# Patient Record
Sex: FEMALE | Race: ASIAN | NOT HISPANIC OR LATINO | URBAN - METROPOLITAN AREA
[De-identification: names, ages, dates, MRNs, and addresses within clinical notes are randomized per-mention and may not be internally consistent; named-entity substitution may affect disease eponyms.]

---

## 2017-03-30 ENCOUNTER — OUTPATIENT (OUTPATIENT)
Dept: OUTPATIENT SERVICES | Facility: HOSPITAL | Age: 62
LOS: 1 days | Discharge: HOME | End: 2017-03-30

## 2017-06-27 DIAGNOSIS — N63 UNSPECIFIED LUMP IN BREAST: ICD-10-CM

## 2017-06-27 DIAGNOSIS — N60.22 FIBROADENOSIS OF LEFT BREAST: ICD-10-CM

## 2017-06-27 DIAGNOSIS — N62 HYPERTROPHY OF BREAST: ICD-10-CM

## 2020-07-30 ENCOUNTER — APPOINTMENT (RX ONLY)
Dept: URBAN - METROPOLITAN AREA CLINIC 57 | Facility: CLINIC | Age: 65
Setting detail: DERMATOLOGY
End: 2020-07-30

## 2020-07-30 DIAGNOSIS — L81.4 OTHER MELANIN HYPERPIGMENTATION: ICD-10-CM

## 2020-07-30 DIAGNOSIS — L57.8 OTHER SKIN CHANGES DUE TO CHRONIC EXPOSURE TO NONIONIZING RADIATION: ICD-10-CM

## 2020-07-30 DIAGNOSIS — D18.0 HEMANGIOMA: ICD-10-CM

## 2020-07-30 DIAGNOSIS — L82.1 OTHER SEBORRHEIC KERATOSIS: ICD-10-CM

## 2020-07-30 DIAGNOSIS — D22 MELANOCYTIC NEVI: ICD-10-CM

## 2020-07-30 PROBLEM — D18.01 HEMANGIOMA OF SKIN AND SUBCUTANEOUS TISSUE: Status: ACTIVE | Noted: 2020-07-30

## 2020-07-30 PROBLEM — D22.9 MELANOCYTIC NEVI, UNSPECIFIED: Status: ACTIVE | Noted: 2020-07-30

## 2020-07-30 PROCEDURE — ? FULL BODY SKIN EXAM

## 2020-07-30 PROCEDURE — ? COUNSELING

## 2020-07-30 PROCEDURE — ? ADDITIONAL NOTES

## 2020-07-30 PROCEDURE — 99203 OFFICE O/P NEW LOW 30 MIN: CPT

## 2020-07-30 ASSESSMENT — LOCATION ZONE DERM: LOCATION ZONE: TRUNK

## 2020-07-30 ASSESSMENT — LOCATION DETAILED DESCRIPTION DERM
LOCATION DETAILED: PERIUMBILICAL SKIN
LOCATION DETAILED: RIGHT INFERIOR UPPER BACK

## 2020-07-30 ASSESSMENT — LOCATION SIMPLE DESCRIPTION DERM
LOCATION SIMPLE: ABDOMEN
LOCATION SIMPLE: RIGHT UPPER BACK

## 2021-08-02 ENCOUNTER — APPOINTMENT (RX ONLY)
Dept: URBAN - METROPOLITAN AREA CLINIC 57 | Facility: CLINIC | Age: 66
Setting detail: DERMATOLOGY
End: 2021-08-02

## 2021-08-02 DIAGNOSIS — L82.1 OTHER SEBORRHEIC KERATOSIS: ICD-10-CM

## 2021-08-02 DIAGNOSIS — L57.8 OTHER SKIN CHANGES DUE TO CHRONIC EXPOSURE TO NONIONIZING RADIATION: ICD-10-CM

## 2021-08-02 DIAGNOSIS — L81.4 OTHER MELANIN HYPERPIGMENTATION: ICD-10-CM

## 2021-08-02 DIAGNOSIS — D18.0 HEMANGIOMA: ICD-10-CM

## 2021-08-02 DIAGNOSIS — D22 MELANOCYTIC NEVI: ICD-10-CM

## 2021-08-02 PROBLEM — D18.01 HEMANGIOMA OF SKIN AND SUBCUTANEOUS TISSUE: Status: ACTIVE | Noted: 2021-08-02

## 2021-08-02 PROBLEM — D22.9 MELANOCYTIC NEVI, UNSPECIFIED: Status: ACTIVE | Noted: 2021-08-02

## 2021-08-02 PROCEDURE — 99213 OFFICE O/P EST LOW 20 MIN: CPT

## 2021-08-02 PROCEDURE — ? TREATMENT REGIMEN

## 2021-08-02 PROCEDURE — ? COUNSELING

## 2021-08-02 PROCEDURE — ? FULL BODY SKIN EXAM

## 2021-08-02 PROCEDURE — ? ADDITIONAL NOTES

## 2021-08-02 ASSESSMENT — LOCATION DETAILED DESCRIPTION DERM
LOCATION DETAILED: INFERIOR THORACIC SPINE
LOCATION DETAILED: RIGHT INFERIOR UPPER BACK
LOCATION DETAILED: PERIUMBILICAL SKIN

## 2021-08-02 ASSESSMENT — LOCATION ZONE DERM: LOCATION ZONE: TRUNK

## 2021-08-02 ASSESSMENT — LOCATION SIMPLE DESCRIPTION DERM
LOCATION SIMPLE: ABDOMEN
LOCATION SIMPLE: RIGHT UPPER BACK
LOCATION SIMPLE: UPPER BACK

## 2022-08-15 ENCOUNTER — APPOINTMENT (RX ONLY)
Dept: URBAN - METROPOLITAN AREA CLINIC 57 | Facility: CLINIC | Age: 67
Setting detail: DERMATOLOGY
End: 2022-08-15

## 2022-08-15 DIAGNOSIS — D22 MELANOCYTIC NEVI: ICD-10-CM | Status: STABLE

## 2022-08-15 DIAGNOSIS — Z41.9 ENCOUNTER FOR PROCEDURE FOR PURPOSES OTHER THAN REMEDYING HEALTH STATE, UNSPECIFIED: ICD-10-CM

## 2022-08-15 DIAGNOSIS — Z12.83 ENCOUNTER FOR SCREENING FOR MALIGNANT NEOPLASM OF SKIN: ICD-10-CM

## 2022-08-15 DIAGNOSIS — L82.1 OTHER SEBORRHEIC KERATOSIS: ICD-10-CM | Status: STABLE

## 2022-08-15 DIAGNOSIS — L81.4 OTHER MELANIN HYPERPIGMENTATION: ICD-10-CM | Status: STABLE

## 2022-08-15 DIAGNOSIS — D18.0 HEMANGIOMA: ICD-10-CM | Status: STABLE

## 2022-08-15 PROBLEM — D18.01 HEMANGIOMA OF SKIN AND SUBCUTANEOUS TISSUE: Status: ACTIVE | Noted: 2022-08-15

## 2022-08-15 PROBLEM — D23.71 OTHER BENIGN NEOPLASM OF SKIN OF RIGHT LOWER LIMB, INCLUDING HIP: Status: ACTIVE | Noted: 2022-08-15

## 2022-08-15 PROBLEM — D22.5 MELANOCYTIC NEVI OF TRUNK: Status: ACTIVE | Noted: 2022-08-15

## 2022-08-15 PROCEDURE — ? COSMETIC CONSULTATION: BOTULINUM TOXIN

## 2022-08-15 PROCEDURE — ? COUNSELING

## 2022-08-15 PROCEDURE — ? FULL BODY SKIN EXAM

## 2022-08-15 PROCEDURE — ? ADDITIONAL NOTES

## 2022-08-15 PROCEDURE — 99213 OFFICE O/P EST LOW 20 MIN: CPT

## 2022-08-15 PROCEDURE — ? SUNSCREEN RECOMMENDATIONS

## 2022-08-15 ASSESSMENT — LOCATION DETAILED DESCRIPTION DERM
LOCATION DETAILED: LEFT MEDIAL UPPER BACK
LOCATION DETAILED: PERIUMBILICAL SKIN
LOCATION DETAILED: EPIGASTRIC SKIN
LOCATION DETAILED: RIGHT MEDIAL UPPER BACK
LOCATION DETAILED: LEFT INFERIOR MEDIAL UPPER BACK

## 2022-08-15 ASSESSMENT — LOCATION ZONE DERM: LOCATION ZONE: TRUNK

## 2022-08-15 ASSESSMENT — LOCATION SIMPLE DESCRIPTION DERM
LOCATION SIMPLE: LEFT UPPER BACK
LOCATION SIMPLE: ABDOMEN
LOCATION SIMPLE: RIGHT UPPER BACK

## 2022-08-15 NOTE — PROCEDURE: ADDITIONAL NOTES
Render Risk Assessment In Note?: no
Additional Notes: Recommend 50 units for forehead, glabella and crows feet total $700
Detail Level: Detailed

## 2022-08-18 ENCOUNTER — APPOINTMENT (RX ONLY)
Dept: URBAN - METROPOLITAN AREA CLINIC 57 | Facility: CLINIC | Age: 67
Setting detail: DERMATOLOGY
End: 2022-08-18

## 2022-08-18 DIAGNOSIS — Z41.9 ENCOUNTER FOR PROCEDURE FOR PURPOSES OTHER THAN REMEDYING HEALTH STATE, UNSPECIFIED: ICD-10-CM

## 2022-08-18 PROCEDURE — ? MEDICAL CONSULTATION HYDRAFACIAL

## 2022-08-18 PROCEDURE — ? ADDITIONAL NOTES

## 2022-08-18 PROCEDURE — ? MEDICAL CONSULTATION: LASER RESURFACING

## 2022-08-18 NOTE — PROCEDURE: ADDITIONAL NOTES
Render Risk Assessment In Note?: yes
Detail Level: Zone
Additional Notes: Plan:\\n\\nResurfacing and hydrafacial monthly for 4 months.\\nFollowing treatment series, you’ll receive a resurfacing treatment and a hydrafacial every 6 months to maintain. You may also invest in Botox post series to maintain results as well.\\n\\nUPCOMING APPOINTMENTS:\\n8/19 @1:30pm Laser\\n9/2 @10:30am Hydrafacial\\n10/11 @11am Laser\\n10/21 @10:30am Hydrafacial\\n11/1 @10:30am Laser\\n11/14 @10:30am Hydrafacial\\n1/9/23 @10:30am Laser\\n1/23/23 @10:30am Hydrafacial

## 2022-08-19 ENCOUNTER — APPOINTMENT (RX ONLY)
Dept: URBAN - METROPOLITAN AREA CLINIC 57 | Facility: CLINIC | Age: 67
Setting detail: DERMATOLOGY
End: 2022-08-19

## 2022-08-19 DIAGNOSIS — Z41.9 ENCOUNTER FOR PROCEDURE FOR PURPOSES OTHER THAN REMEDYING HEALTH STATE, UNSPECIFIED: ICD-10-CM

## 2022-08-19 PROCEDURE — ? VENUS VIVA

## 2022-08-19 ASSESSMENT — LOCATION SIMPLE DESCRIPTION DERM
LOCATION SIMPLE: LEFT CHEEK
LOCATION SIMPLE: LEFT EYEBROW
LOCATION SIMPLE: UPPER LIP

## 2022-08-19 ASSESSMENT — LOCATION DETAILED DESCRIPTION DERM
LOCATION DETAILED: LEFT SUPERIOR CENTRAL MALAR CHEEK
LOCATION DETAILED: LEFT CENTRAL EYEBROW
LOCATION DETAILED: PHILTRUM

## 2022-08-19 ASSESSMENT — LOCATION ZONE DERM
LOCATION ZONE: LIP
LOCATION ZONE: FACE

## 2022-08-19 NOTE — PROCEDURE: VENUS VIVA
Volts: 200
Pre-Procedures Photographs: Yes
Applicator: Viva
Topical Anesthesia?: BLT cream (benzocaine 20%, lidocaine 6%, tetracaine 4%)
Treatment Number: 1
Final Radiofrequency %: 35
Applicator: Viva
Ending Temperature In C: 45
Applicator: diamondpolar
Price (Use Numbers Only, No Special Characters Or $): 297.83
Consent: Written consent obtained, risks reviewed including but not limited to crusting, scabbing, blistering, scarring, darker or lighter pigmentary change, and/or incomplete removal.
Milliseconds: 15
Length Topical Anesthesia Applied (Optional): 30 minutes
Starting Temperature In C: 30
Patient Discomfort: no
Post-Care Instructions: I reviewed with the patient in detail post-care instructions. Patient should stay away from the sun and wear sun protection until treated areas are fully healed.
Location (Required For Details To Render): Periocular (below brow, lower lids, crows feet, and forehead)
Post-Procedure Text: Patient was educated on post care. \\n\\nUtilize ONLY Avene thermal spring water for the first 24hrs\\nWash face with foaming cleanser (2x daily)\\nApply enzyme gel moisturizer (2x daily)\\nContinue using the Avene water spray daily\\n\\nFollow post care instructions for 7 days
Detail Level: Simple
Location: Perioral
Ending Temperature In C: 39
Immediate Post-Procedure Findings: mild erythema, no edema

## 2022-09-02 ENCOUNTER — APPOINTMENT (RX ONLY)
Dept: URBAN - METROPOLITAN AREA CLINIC 57 | Facility: CLINIC | Age: 67
Setting detail: DERMATOLOGY
End: 2022-09-02

## 2022-09-02 DIAGNOSIS — Z41.9 ENCOUNTER FOR PROCEDURE FOR PURPOSES OTHER THAN REMEDYING HEALTH STATE, UNSPECIFIED: ICD-10-CM

## 2022-09-02 PROCEDURE — ? ADDITIONAL NOTES

## 2022-09-02 PROCEDURE — ? HYDRAFACIAL

## 2022-09-02 NOTE — PROCEDURE: ADDITIONAL NOTES
Detail Level: Zone
Additional Notes: Discussed doing a patch test behind ear with juan carlos to determine hyperpigmentation factors due to Meza. Will wait 4 weeks to read the results before determination.
Render Risk Assessment In Note?: yes

## 2022-09-02 NOTE — PROCEDURE: HYDRAFACIAL
Consent: Written consent obtained, risks reviewed including but not limited to crusting, scabbing, blistering, scarring, darker or lighter pigmentary change, bruising, and/or incomplete response.
Vacuum Pressure High Setting (Will Not Render If Set To 0): 0
Tip: Hydropeel Tip, Clear
Post-Care Instructions: I reviewed with the patient in detail post-care instructions. Patient should avoid direct sun exposure and wear sunscreen daily.
Price (Use Numbers Only, No Special Characters Or $): 299.00
Tip Override
Treatment Number: 1
Solution Override: Antiox +
Tip: Hydropeel Tip, Purple Aggression
Indication: anti-aging
Solution: GlySal 15%
Vacuum Pressure Low Setting (Will Not Render If Set To 0): 16
Solution: Antiox-6
Vacuum Pressure High Setting (Will Not Render If Set To 0): 10
Solution Override: Circadia Protec Plus Booster
Location: face
Solution Override
Solution: Activ-4
Procedure: Peel
Vacuum Pressure Low Setting (Will Not Render If Set To 0): 14

## 2022-10-11 ENCOUNTER — APPOINTMENT (RX ONLY)
Dept: URBAN - METROPOLITAN AREA CLINIC 57 | Facility: CLINIC | Age: 67
Setting detail: DERMATOLOGY
End: 2022-10-11

## 2022-10-11 DIAGNOSIS — Z41.9 ENCOUNTER FOR PROCEDURE FOR PURPOSES OTHER THAN REMEDYING HEALTH STATE, UNSPECIFIED: ICD-10-CM

## 2022-10-11 PROCEDURE — ? VENUS VIVA

## 2022-10-11 PROCEDURE — ? ADDITIONAL NOTES

## 2022-10-11 ASSESSMENT — LOCATION SIMPLE DESCRIPTION DERM
LOCATION SIMPLE: RIGHT INFERIOR EYELID
LOCATION SIMPLE: UPPER LIP
LOCATION SIMPLE: LEFT CHEEK

## 2022-10-11 ASSESSMENT — LOCATION ZONE DERM
LOCATION ZONE: FACE
LOCATION ZONE: EYELID
LOCATION ZONE: LIP

## 2022-10-11 ASSESSMENT — LOCATION DETAILED DESCRIPTION DERM
LOCATION DETAILED: PHILTRUM
LOCATION DETAILED: LEFT SUPERIOR CENTRAL MALAR CHEEK
LOCATION DETAILED: RIGHT LATERAL INFERIOR EYELID

## 2022-10-11 NOTE — PROCEDURE: VENUS VIVA
Patient Discomfort: no
Starting Radiofrequency %: 35
Milliseconds: 25
Applicator: diamondpolar
Length Topical Anesthesia Applied (Optional): 20 minutes
Treatment Number: 1
Final Radiofrequency %: 45
Volts: 235
Ending Temperature In C: 39
Consent: Written consent obtained, risks reviewed including but not limited to crusting, scabbing, blistering, scarring, darker or lighter pigmentary change, and/or incomplete removal.
Post-Procedure Text: Patient was educated on post care. \\n\\nUtilize ONLY Avene thermal spring water for the first 24hrs\\nWash face with foaming cleanser (2x daily)\\nApply enzyme gel moisturizer (2x daily)\\nContinue using the Avene water spray daily\\n\\nFollow post care instructions for 7 days
Immediate Post-Procedure Findings: mild erythema, no edema
Location: Perioral
Applicator: Viva
Topical Anesthesia?: BLT cream (benzocaine 20%, lidocaine 6%, tetracaine 4%)
Applicator: Viva
Pre-Procedures Photographs: Yes
Post-Care Instructions: I reviewed with the patient in detail post-care instructions. Patient should stay away from the sun and wear sun protection until treated areas are fully healed.\\n\\nPatient was educated on post care. \\n\\nUtilize ONLY Avene thermal spring water for the first 24hrs\\nWash face with foaming cleanser (2x daily)\\nApply enzyme gel moisturizer (2x daily)\\nContinue using the Avene water spray daily\\n\\nFollow post care instructions for 7 days
Detail Level: Simple
Starting Temperature In C: 30
Location (Required For Details To Render): Upper face
Price (Use Numbers Only, No Special Characters Or $): 350.00

## 2022-10-11 NOTE — PROCEDURE: ADDITIONAL NOTES
Detail Level: Zone
Render Risk Assessment In Note?: yes
Additional Notes: Patient declined test spot for subnovii. States she would like to wait to see how effect current treatment method is before deciding to do subnovii

## 2022-10-21 ENCOUNTER — APPOINTMENT (RX ONLY)
Dept: URBAN - METROPOLITAN AREA CLINIC 57 | Facility: CLINIC | Age: 67
Setting detail: DERMATOLOGY
End: 2022-10-21

## 2022-10-21 DIAGNOSIS — Z41.9 ENCOUNTER FOR PROCEDURE FOR PURPOSES OTHER THAN REMEDYING HEALTH STATE, UNSPECIFIED: ICD-10-CM

## 2022-10-21 PROCEDURE — ? PRODUCT LINE (REVISION)

## 2022-10-21 PROCEDURE — ? ADDITIONAL NOTES

## 2022-10-21 PROCEDURE — ? HYDRAFACIAL

## 2022-10-21 NOTE — PROCEDURE: PRODUCT LINE (REVISION)
Name Of Product 10: Revox Line Relaxer 1.0oz
Product 25 Units: 0
Product 44 Price (In Dollars - Numeric Only, No Special Characters Or $): 0.00
Product 5 Price (In Dollars - Numeric Only, No Special Characters Or $): 168.00
Product 5 Application Directions: Apply a pea sized amount to face only at night.
Send Charges To Patient Encounter: Yes
Product 3 Price (In Dollars - Numeric Only, No Special Characters Or $): 152.00
Product 7 Application Directions: Apply to face and inside derrick/cavum of the ear to reduce oil production every morning
Product 9 Application Directions: Apply to backs of hands morning and night post 1 week vi peel
Product 11 Application Directions: Apply 1-2 Pumps prior to applying moisturizer (AM/PM)\\nYou may mix this product with your dej face cream for one single application, or you can layer your products.
Product 3 Application Directions: Apply to face in the morning and evening. Follow morning application apply a mineral sunscreen. (UV elements)
Product 7 Price (In Dollars - Numeric Only, No Special Characters Or $): 38.00
Product 9 Price (In Dollars - Numeric Only, No Special Characters Or $): 58.00
Risk Of Complication Category: No MDM
Product 11 Price (In Dollars - Numeric Only, No Special Characters Or $): 198.00
Name Of Product 4: DEJ eye cream
Name Of Product 7: Soothing facial rinse
Assigning Risk Information: Per AMA, level of risk is based upon consequences of the problem(s) addressed at the encounter when appropriately treated. Risk also includes medical decision making related to the need to initiate or forego further testing, treatment and/or hospitalization. Over the counter medication are assigned a risk level of low. Prescription medication management is assigned a risk level of moderate.
Name Of Product 2: Nectifirm Advanced
Name Of Product 9: Lumiquin hand cream
Name Of Product 11: Revox Line Relaxer
Product 4 Price (In Dollars - Numeric Only, No Special Characters Or $): 109.00
Product 6 Application Directions: Apply pea size amount to face morning and night
Product 2 Price (In Dollars - Numeric Only, No Special Characters Or $): 51.00
Product 8 Application Directions: Apply pea sized amount to face only in the evening, either in conjunction with the dej face cream or separately. Begin using product on Monday’s and Thursday’s for 3-4 weeks before increasing usage. Avoid eye area.
Product 1 Application Directions: Apply 2x daily before moisturizer\\nApply pea sized amount to hands prior to tretinoin
Detail Level: Zone
Product 10 Application Directions: Apply serum prior to moisturizer twice daily (AM/PM)
Product 4 Units: 1
Name Of Product 1: Vitamin C 15%
Product 4 Application Directions: Apply pea sized amount to both eyes morning and night
Product 6 Price (In Dollars - Numeric Only, No Special Characters Or $): 90.00
Product 8 Price (In Dollars - Numeric Only, No Special Characters Or $): 100.00
Product 2 Application Directions: Apply to neck, twice daily in upward motion.\\nApply a mineral sunscreen on top of the product when using in the AM
Product 10 Price (In Dollars - Numeric Only, No Special Characters Or $): 180.00
Name Of Product 5: DEJ Night Face Cream
Name Of Product 6: Hydrating Serum
Name Of Product 8: Retinol 0.5%
Name Of Product 3: DEJ Face Cream

## 2022-10-21 NOTE — PROCEDURE: HYDRAFACIAL
Number Of Passes: 0
Treatment Number: 1
Solution Override
Solution: GlySal 7.5%
Price (Use Numbers Only, No Special Characters Or $): 299.00
Solution: Antiox-6
Solution: Activ-4
Vacuum Pressure High Setting (Will Not Render If Set To 0): 10
Procedure: Peel
Tip: Hydropeel Tip, Clear
Vacuum Pressure Low Setting (Will Not Render If Set To 0): 14
Tip Override
Vacuum Pressure Low Setting (Will Not Render If Set To 0): 16
Location: face
Tip: Hydropeel Tip, Purple Aggression
Solution Override: Circadia Protec Plus Booster
Post-Care Instructions: I reviewed with the patient in detail post-care instructions. Patient should avoid direct sun exposure and wear sunscreen daily.
Solution Override: Antiox +
Consent: Written consent obtained, risks reviewed including but not limited to crusting, scabbing, blistering, scarring, darker or lighter pigmentary change, bruising, and/or incomplete response.
Indication: anti-aging

## 2022-10-21 NOTE — PROCEDURE: ADDITIONAL NOTES
Additional Notes: Patient asked to utilize her prepaid amount for resurfacing towards her hydrafacial today which was redeemed at full price. ($299) made patient aware she would just have to pay the remaining balance of her last resurfacing treatment out of pocket. Patient states understanding.
Render Risk Assessment In Note?: yes
Detail Level: Zone

## 2022-11-01 ENCOUNTER — APPOINTMENT (RX ONLY)
Dept: URBAN - METROPOLITAN AREA CLINIC 57 | Facility: CLINIC | Age: 67
Setting detail: DERMATOLOGY
End: 2022-11-01

## 2022-11-01 DIAGNOSIS — Z41.9 ENCOUNTER FOR PROCEDURE FOR PURPOSES OTHER THAN REMEDYING HEALTH STATE, UNSPECIFIED: ICD-10-CM

## 2022-11-01 PROCEDURE — ? VENUS VIVA

## 2022-11-01 ASSESSMENT — LOCATION DETAILED DESCRIPTION DERM
LOCATION DETAILED: LEFT SUPERIOR CENTRAL MALAR CHEEK
LOCATION DETAILED: LEFT LATERAL EYEBROW
LOCATION DETAILED: PHILTRUM

## 2022-11-01 ASSESSMENT — LOCATION ZONE DERM
LOCATION ZONE: LIP
LOCATION ZONE: FACE

## 2022-11-01 ASSESSMENT — LOCATION SIMPLE DESCRIPTION DERM
LOCATION SIMPLE: LEFT CHEEK
LOCATION SIMPLE: LEFT EYEBROW
LOCATION SIMPLE: UPPER LIP

## 2022-11-01 NOTE — PROCEDURE: VENUS VIVA
Length Topical Anesthesia Applied (Optional): 20 minutes
Starting Temperature In C: 35
Final Radiofrequency %: 45
Patient Discomfort: no
Applicator: Viva
Applicator: Viva
Detail Level: Simple
Milliseconds: 25
Post-Care Instructions: I reviewed with the patient in detail post-care instructions. Patient should stay away from the sun and wear sun protection until treated areas are fully healed.\\n\\nPatient was educated on post care. \\n\\nUtilize ONLY Avene thermal spring water for the first 24hrs\\nWash face with foaming cleanser (2x daily)\\nApply enzyme gel moisturizer (2x daily)\\nContinue using the Avene water spray daily\\n\\nFollow post care instructions for 7 days
Starting Temperature In C: 30
Post-Procedure Text: Patient was educated on post care. \\n\\nUtilize ONLY Avene thermal spring water for the first 24hrs\\nWash face with foaming cleanser (2x daily)\\nApply enzyme gel moisturizer (2x daily)\\nContinue using the Avene water spray daily\\n\\nFollow post care instructions for 7 days
Treatment Number: 1
Topical Anesthesia?: BLT cream (benzocaine 20%, lidocaine 6%, tetracaine 4%)
Applicator: diamondpolar
Volts: 235
Location: Perioral & eye area
Milliseconds: 20
Immediate Post-Procedure Findings: mild erythema, no edema
Price (Use Numbers Only, No Special Characters Or $): 297.81
Ending Temperature In C: 39
Consent: Written consent obtained, risks reviewed including but not limited to crusting, scabbing, blistering, scarring, darker or lighter pigmentary change, and/or incomplete removal.
Treatment Number: 3
Volts: 240

## 2022-11-14 ENCOUNTER — APPOINTMENT (RX ONLY)
Dept: URBAN - METROPOLITAN AREA CLINIC 57 | Facility: CLINIC | Age: 67
Setting detail: DERMATOLOGY
End: 2022-11-14

## 2022-11-14 DIAGNOSIS — Z41.9 ENCOUNTER FOR PROCEDURE FOR PURPOSES OTHER THAN REMEDYING HEALTH STATE, UNSPECIFIED: ICD-10-CM

## 2022-11-14 PROCEDURE — ? HYDRAFACIAL

## 2023-06-15 NOTE — PROCEDURE: HYDRAFACIAL
Solution Override: Circadia Protec Plus Booster
Vacuum Pressure Low Setting (Will Not Render If Set To 0): 0
Solution Override: Antiox +
Tip Override
Tip: Hydropeel Tip, Clear
Vacuum Pressure Low Setting (Will Not Render If Set To 0): 16
Tip: Hydropeel Tip, Purple Aggression
Location: face
Vacuum Pressure High Setting (Will Not Render If Set To 0): 10
Solution Override
Procedure: Peel
Solution: Antiox-6
Solution: Activ-4
Price (Use Numbers Only, No Special Characters Or $): 364.00
Consent: Written consent obtained, risks reviewed including but not limited to crusting, scabbing, blistering, scarring, darker or lighter pigmentary change, bruising, and/or incomplete response.
Indication: anti-aging
Solution: GlySal 7.5%
Vacuum Pressure Low Setting (Will Not Render If Set To 0): 14
Post-Care Instructions: I reviewed with the patient in detail post-care instructions. Patient should avoid direct sun exposure and wear sunscreen daily.
Yes
